# Patient Record
Sex: MALE | Race: WHITE | NOT HISPANIC OR LATINO
[De-identification: names, ages, dates, MRNs, and addresses within clinical notes are randomized per-mention and may not be internally consistent; named-entity substitution may affect disease eponyms.]

---

## 2024-08-23 ENCOUNTER — NON-APPOINTMENT (OUTPATIENT)
Age: 64
End: 2024-08-23

## 2024-08-23 PROBLEM — Z00.00 ENCOUNTER FOR PREVENTIVE HEALTH EXAMINATION: Status: ACTIVE | Noted: 2024-08-23

## 2024-08-27 ENCOUNTER — APPOINTMENT (OUTPATIENT)
Dept: ORTHOPEDIC SURGERY | Facility: CLINIC | Age: 64
End: 2024-08-27
Payer: COMMERCIAL

## 2024-08-27 VITALS — HEIGHT: 70.5 IN | BODY MASS INDEX: 32.42 KG/M2 | WEIGHT: 229 LBS

## 2024-08-27 DIAGNOSIS — Z80.9 FAMILY HISTORY OF MALIGNANT NEOPLASM, UNSPECIFIED: ICD-10-CM

## 2024-08-27 DIAGNOSIS — Z86.69 PERSONAL HISTORY OF OTHER DISEASES OF THE NERVOUS SYSTEM AND SENSE ORGANS: ICD-10-CM

## 2024-08-27 DIAGNOSIS — Z82.61 FAMILY HISTORY OF ARTHRITIS: ICD-10-CM

## 2024-08-27 DIAGNOSIS — Z78.9 OTHER SPECIFIED HEALTH STATUS: ICD-10-CM

## 2024-08-27 DIAGNOSIS — M50.20 OTHER CERVICAL DISC DISPLACEMENT, UNSPECIFIED CERVICAL REGION: ICD-10-CM

## 2024-08-27 DIAGNOSIS — M67.912 UNSPECIFIED DISORDER OF SYNOVIUM AND TENDON, LEFT SHOULDER: ICD-10-CM

## 2024-08-27 DIAGNOSIS — M75.42 IMPINGEMENT SYNDROME OF LEFT SHOULDER: ICD-10-CM

## 2024-08-27 PROCEDURE — 99203 OFFICE O/P NEW LOW 30 MIN: CPT

## 2024-08-27 PROCEDURE — 73030 X-RAY EXAM OF SHOULDER: CPT | Mod: LT

## 2024-08-27 RX ORDER — CELECOXIB 200 MG/1
200 CAPSULE ORAL DAILY
Qty: 30 | Refills: 0 | Status: ACTIVE | COMMUNITY
Start: 2024-08-27 | End: 1900-01-01

## 2024-08-27 RX ORDER — FEXOFENADINE HCL 60 MG
CAPSULE ORAL
Refills: 0 | Status: ACTIVE | COMMUNITY

## 2024-08-27 NOTE — PHYSICAL EXAM
[UE] : Sensory: Intact in bilateral upper extremities [Normal RUE] : Right Upper Extremity: No scars, rashes, lesions, ulcers, skin intact [Normal LUE] : Left Upper Extremity: No scars, rashes, lesions, ulcers, skin intact [Normal Touch] : sensation intact for touch [Normal] : Gait: normal [de-identified] : Left shoulder and right for comparison No edema, ecchymoses, erythema, deformity. Cervical spine range of motion intact without pain. Tender mildly at the left AC joint. Left shoulder active range of motion is with 180 degrees forward elevation and internal rotation to T7.  Passively in 90 degrees abduction there is 90 degrees external rotation and 60 degrees internal rotation with pain. Mild crepitus in the shoulder. Motor is with 5 -/5 supraspinatus with pain and 5/5 internal and external rotation and biceps. Positive Neer.  Negative Mcgrath. Normal neurovascular exam in the upper extremity [de-identified] :  X-rays ordered, performed and reviewed today of left shoulder AP, Y lateral and axillary views showed mild sclerosis at the AC joint consistent with mild AC joint arthrosis.  No bone lesions.  No elevation humeral head.  No calcifications or fractures.

## 2024-08-27 NOTE — HISTORY OF PRESENT ILLNESS
[de-identified] :  Mr. Isbell is a 63-year-old right hand dominant man who comes in for evaluation for left shoulder pain that started a couple months ago out of nowhere.  pt states that up until a 1 1/2 week ago his left shoulder would only hurt if he lifted a weight overhead but recently the pain has gotten progressively worse and feels achy a lot.  He takes Advil and ibuprofen to help with the pain but has had no other treatment. His pain currently is about 6 out of 10.  It can be sharp, burning and sometimes shooting better with rest and medication and worse with lifting the arm overhead. He has a history of C5-6 fusion in 2004.  At that time he had radicular pain and the current pain feels different and more localized to his shoulder.  No history of any injuries.

## 2024-08-27 NOTE — ASSESSMENT
[FreeTextEntry1] : 62 y/o male right-hand-dominant with left shoulder pain over the last few months consistent with impingement syndrome/rotator cuff tendinopathy and bursitis.  I talked to him about how the rotator cuff frequently becomes degenerative with aging and patients can get degenerative tears as well.  At this point he has good motion and good strength but pain.  I recommended taking an anti-inflammatory and prescribed Celebrex to take consistently for couple weeks and then as needed.  If he has any side effects he should stop.  He had taken Celebrex years ago with no problem. I also gave him home exercise program with surgical tubing bands and information about impingement. He was referred to formal physical therapy. If the shoulder is not feeling better in 6 to 8 weeks then he should come in for follow-up or follow-up as needed if pain is getting worse.

## 2024-08-27 NOTE — PHYSICAL EXAM
[UE] : Sensory: Intact in bilateral upper extremities [Normal RUE] : Right Upper Extremity: No scars, rashes, lesions, ulcers, skin intact [Normal LUE] : Left Upper Extremity: No scars, rashes, lesions, ulcers, skin intact [Normal Touch] : sensation intact for touch [Normal] : Gait: normal [de-identified] : Left shoulder and right for comparison No edema, ecchymoses, erythema, deformity. Cervical spine range of motion intact without pain. Tender mildly at the left AC joint. Left shoulder active range of motion is with 180 degrees forward elevation and internal rotation to T7.  Passively in 90 degrees abduction there is 90 degrees external rotation and 60 degrees internal rotation with pain. Mild crepitus in the shoulder. Motor is with 5 -/5 supraspinatus with pain and 5/5 internal and external rotation and biceps. Positive Neer.  Negative Mcgrath. Normal neurovascular exam in the upper extremity [de-identified] :  X-rays ordered, performed and reviewed today of left shoulder AP, Y lateral and axillary views showed mild sclerosis at the AC joint consistent with mild AC joint arthrosis.  No bone lesions.  No elevation humeral head.  No calcifications or fractures.

## 2024-08-27 NOTE — HISTORY OF PRESENT ILLNESS
[de-identified] :  Mr. Isbell is a 63-year-old right hand dominant man who comes in for evaluation for left shoulder pain that started a couple months ago out of nowhere.  pt states that up until a 1 1/2 week ago his left shoulder would only hurt if he lifted a weight overhead but recently the pain has gotten progressively worse and feels achy a lot.  He takes Advil and ibuprofen to help with the pain but has had no other treatment. His pain currently is about 6 out of 10.  It can be sharp, burning and sometimes shooting better with rest and medication and worse with lifting the arm overhead. He has a history of C5-6 fusion in 2004.  At that time he had radicular pain and the current pain feels different and more localized to his shoulder.  No history of any injuries.